# Patient Record
Sex: FEMALE | Race: OTHER | HISPANIC OR LATINO | URBAN - METROPOLITAN AREA
[De-identification: names, ages, dates, MRNs, and addresses within clinical notes are randomized per-mention and may not be internally consistent; named-entity substitution may affect disease eponyms.]

---

## 2018-04-06 ENCOUNTER — EMERGENCY (EMERGENCY)
Facility: HOSPITAL | Age: 73
LOS: 1 days | Discharge: HOME | End: 2018-04-06
Attending: EMERGENCY MEDICINE | Admitting: EMERGENCY MEDICINE
Payer: MEDICARE

## 2018-04-06 VITALS
HEART RATE: 78 BPM | TEMPERATURE: 98 F | RESPIRATION RATE: 16 BRPM | SYSTOLIC BLOOD PRESSURE: 172 MMHG | OXYGEN SATURATION: 95 % | DIASTOLIC BLOOD PRESSURE: 92 MMHG

## 2018-04-06 DIAGNOSIS — R68.84 JAW PAIN: ICD-10-CM

## 2018-04-06 DIAGNOSIS — M79.601 PAIN IN RIGHT ARM: ICD-10-CM

## 2018-04-06 LAB
ANION GAP SERPL CALC-SCNC: 6 MMOL/L — LOW (ref 9–16)
BASOPHILS NFR BLD AUTO: 0.6 % — SIGNIFICANT CHANGE UP (ref 0–2)
BUN SERPL-MCNC: 15 MG/DL — SIGNIFICANT CHANGE UP (ref 7–23)
CALCIUM SERPL-MCNC: 9.5 MG/DL — SIGNIFICANT CHANGE UP (ref 8.5–10.5)
CHLORIDE SERPL-SCNC: 105 MMOL/L — SIGNIFICANT CHANGE UP (ref 96–108)
CO2 SERPL-SCNC: 26 MMOL/L — SIGNIFICANT CHANGE UP (ref 22–31)
CREAT SERPL-MCNC: 0.63 MG/DL — SIGNIFICANT CHANGE UP (ref 0.5–1.3)
EOSINOPHIL NFR BLD AUTO: 1.7 % — SIGNIFICANT CHANGE UP (ref 0–6)
GLUCOSE SERPL-MCNC: 119 MG/DL — HIGH (ref 70–99)
HCT VFR BLD CALC: 38.6 % — SIGNIFICANT CHANGE UP (ref 34.5–45)
HGB BLD-MCNC: 12.8 G/DL — SIGNIFICANT CHANGE UP (ref 11.5–15.5)
IMM GRANULOCYTES NFR BLD AUTO: 0.2 % — SIGNIFICANT CHANGE UP (ref 0–1.5)
LYMPHOCYTES # BLD AUTO: 21.4 % — SIGNIFICANT CHANGE UP (ref 13–44)
MCHC RBC-ENTMCNC: 29.1 PG — SIGNIFICANT CHANGE UP (ref 27–34)
MCHC RBC-ENTMCNC: 33.2 G/DL — SIGNIFICANT CHANGE UP (ref 32–36)
MCV RBC AUTO: 87.7 FL — SIGNIFICANT CHANGE UP (ref 80–100)
MONOCYTES NFR BLD AUTO: 6.4 % — SIGNIFICANT CHANGE UP (ref 2–14)
NEUTROPHILS NFR BLD AUTO: 69.7 % — SIGNIFICANT CHANGE UP (ref 43–77)
PLATELET # BLD AUTO: 246 K/UL — SIGNIFICANT CHANGE UP (ref 150–400)
POTASSIUM SERPL-MCNC: 3.8 MMOL/L — SIGNIFICANT CHANGE UP (ref 3.5–5.3)
POTASSIUM SERPL-SCNC: 3.8 MMOL/L — SIGNIFICANT CHANGE UP (ref 3.5–5.3)
RBC # BLD: 4.4 M/UL — SIGNIFICANT CHANGE UP (ref 3.8–5.2)
RBC # FLD: 12.4 % — SIGNIFICANT CHANGE UP (ref 10.3–16.9)
SODIUM SERPL-SCNC: 137 MMOL/L — SIGNIFICANT CHANGE UP (ref 132–145)
TROPONIN I SERPL-MCNC: <0.017 NG/ML — LOW (ref 0.02–0.06)
WBC # BLD: 5.3 K/UL — SIGNIFICANT CHANGE UP (ref 3.8–10.5)
WBC # FLD AUTO: 5.3 K/UL — SIGNIFICANT CHANGE UP (ref 3.8–10.5)

## 2018-04-06 PROCEDURE — 99284 EMERGENCY DEPT VISIT MOD MDM: CPT

## 2018-04-06 RX ORDER — TRAMADOL HYDROCHLORIDE 50 MG/1
1 TABLET ORAL
Qty: 8 | Refills: 0 | OUTPATIENT
Start: 2018-04-06 | End: 2018-04-07

## 2018-04-06 RX ORDER — KETOROLAC TROMETHAMINE 30 MG/ML
30 SYRINGE (ML) INJECTION ONCE
Qty: 0 | Refills: 0 | Status: DISCONTINUED | OUTPATIENT
Start: 2018-04-06 | End: 2018-04-06

## 2018-04-06 RX ORDER — TRAMADOL HYDROCHLORIDE 50 MG/1
1 TABLET ORAL
Qty: 10 | Refills: 0 | OUTPATIENT
Start: 2018-04-06 | End: 2018-04-10

## 2018-04-06 RX ORDER — TRAMADOL HYDROCHLORIDE 50 MG/1
1 TABLET ORAL
Qty: 1 | Refills: 0 | OUTPATIENT
Start: 2018-04-06

## 2018-04-06 RX ADMIN — Medication 30 MILLIGRAM(S): at 12:07

## 2018-04-06 NOTE — ED PROVIDER NOTE - OBJECTIVE STATEMENT
cc R arm pain x2 weeks, worse w movment. no weaknes/snumbness. radiates into the chest. no fevres/n/v/abd oapin/dairrhea. no sob. no diaphoresis.   no cardiac hx. today had dental procedure done for root canal on L jaw c/o L jaw pain and sewlling. came today for R arm evaluation because her daughter was off work and insisted she have it evaled . pt has pmd at [place]

## 2018-04-06 NOTE — ED PROVIDER NOTE - MUSCULOSKELETAL, MLM
Spine appears normal, range of motion is not limited, no muscle or joint tenderness. Pain w ROM of R shoulder

## 2018-04-06 NOTE — ED ADULT TRIAGE NOTE - CHIEF COMPLAINT QUOTE
Patient reports R arm pain radiating to R chest x2 mos. Denies injury. Had facial biopsy done today at [facility]. Daughter w/patient.

## 2018-04-06 NOTE — ED PROVIDER NOTE - MEDICAL DECISION MAKING DETAILS
low risk acs, pain reproducible. will get torp x1, ekg non ischemic   toradol for pain   neuro inatct, no neck pain. low c/f cervical cord compression.

## 2018-04-06 NOTE — ED PROVIDER NOTE - NEUROLOGICAL, MLM
Alert and oriented, no focal deficits, no motor or sensory deficits. intact bilat 5/5 . 5/5 flex/ext at bilat shoulders/elbows/wrists. able to show two fingers, extend bilat thumbs and oppose index and thumb bilat. 2+ radial pulse bilat

## 2018-04-06 NOTE — ED ADULT NURSE NOTE - CHIEF COMPLAINT QUOTE
Patient reports R arm pain radiating to R chest x2 mos. Denies injury. Had facial biopsy done today at [facility]. Daughter w/patient.

## 2018-04-06 NOTE — ED ADULT NURSE NOTE - OBJECTIVE STATEMENT
worsening right arm pain x 2 months-denies trauma, left jaw swelling noted (pt had teeth removed today), daughter at bedside, will continue to monitor for change in assessment

## 2018-04-06 NOTE — ED PROVIDER NOTE - CARE PLAN
Assessment and plan of treatment:	R arm pain radiating to chest Principal Discharge DX:	Right arm pain  Assessment and plan of treatment:	R arm pain radiating to chest

## 2020-01-05 ENCOUNTER — EMERGENCY (EMERGENCY)
Facility: HOSPITAL | Age: 75
LOS: 1 days | Discharge: HOME | End: 2020-01-05
Attending: EMERGENCY MEDICINE | Admitting: EMERGENCY MEDICINE
Payer: MEDICARE

## 2020-01-05 VITALS
RESPIRATION RATE: 17 BRPM | HEART RATE: 69 BPM | DIASTOLIC BLOOD PRESSURE: 75 MMHG | TEMPERATURE: 97 F | SYSTOLIC BLOOD PRESSURE: 143 MMHG | OXYGEN SATURATION: 96 %

## 2020-01-05 VITALS
RESPIRATION RATE: 17 BRPM | HEART RATE: 88 BPM | WEIGHT: 134.92 LBS | DIASTOLIC BLOOD PRESSURE: 77 MMHG | SYSTOLIC BLOOD PRESSURE: 180 MMHG | OXYGEN SATURATION: 98 % | TEMPERATURE: 98 F
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
## 2020-01-05 DIAGNOSIS — S02.401A MAXILLARY FRACTURE, UNSPECIFIED SIDE, INITIAL ENCOUNTER FOR CLOSED FRACTURE: ICD-10-CM
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
## 2020-01-05 DIAGNOSIS — J34.89 OTHER SPECIFIED DISORDERS OF NOSE AND NASAL SINUSES: ICD-10-CM
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
## 2020-01-05 DIAGNOSIS — S02.2XXA FRACTURE OF NASAL BONES, INITIAL ENCOUNTER FOR CLOSED FRACTURE: ICD-10-CM
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
## 2020-01-05 PROBLEM — K29.60 OTHER GASTRITIS WITHOUT BLEEDING: Chronic | Status: ACTIVE | Noted: 2018-04-06

## 2020-01-05 PROCEDURE — 70486 CT MAXILLOFACIAL W/O DYE: CPT | Mod: 26

## 2020-01-05 PROCEDURE — 21310: CPT

## 2020-01-05 PROCEDURE — 99284 EMERGENCY DEPT VISIT MOD MDM: CPT | Mod: 25

## 2020-01-05 PROCEDURE — 72125 CT NECK SPINE W/O DYE: CPT | Mod: 26

## 2020-01-05 PROCEDURE — 70450 CT HEAD/BRAIN W/O DYE: CPT | Mod: 26

## 2020-01-05 NOTE — ED PROVIDER NOTE - OBJECTIVE STATEMENT
73 y/o F wit PMHx of asthma on Advair, Albuterol, and Qvar presents to the ED s/p fall 3 days ago. States she missed last step and fell forward injuring the bridge of her nose 3 days ago. Today she endorses nasal pain and dizziness described as lightheadedness with a slight headache. Denies LOC, UE pain, neck pain, back pain, visual changes, numbness, weakness, tingling, nausea or vomiting. 75 y/o F wit PMHx of asthma on Advair, Albuterol, and Qvar, taking aspirin presents to the ED s/p fall 3 days ago. States she missed a step and fell forward injuring the bridge of her nose 3 days ago. Today she endorses nasal pain and dizziness described as lightheadedness with a slight headache. Denies LOC, UE pain, neck pain, back pain, visual changes, numbness, weakness, tingling, nausea or vomiting.

## 2020-01-05 NOTE — ED ADULT TRIAGE NOTE - CHIEF COMPLAINT QUOTE
had mechanical fall 2dasys ago with facial trauma. ptwith ecchymosis and hematomas under both eyes. denies loc butnow c/o dizziness. takes baby asa qday

## 2020-01-05 NOTE — ED PROVIDER NOTE - NSFOLLOWUPINSTRUCTIONS_ED_ALL_ED_FT
Facial Fracture    WHAT YOU NEED TO KNOW:    A facial fracture is a break in one or more of the bones in your face. A facial fracture may also damage nearby tissue. Skull         DISCHARGE INSTRUCTIONS:    Call your local emergency number (911 in the [place]) if:     You suddenly feel lightheaded and short of breath.      You have chest pain when you take a deep breath or cough.      You cough up blood.    Return to the emergency department if:     You suddenly have trouble chewing or swallowing.      You have clear or pinkish fluid draining from your nose or mouth.      You have numbness in your face.      You have worsening pain in your eye or face.      You have double vision or sudden trouble seeing.      Your arm or leg feels warm, tender, and painful. It may look swollen and red.    Call your doctor if:     You are bleeding from a wound on your face.      You have questions or concerns about your condition or care.    Medicines: You may need any of the following:     Decongestants help decrease swelling in your nose and sinuses. This medicine may also help you breathe easier.      Prescription pain medicine may be given. Ask your healthcare provider how to take this medicine safely. Some prescription pain medicines contain acetaminophen. Do not take other medicines that contain acetaminophen without talking to your healthcare provider. Too much acetaminophen may cause liver damage. Prescription pain medicine may cause constipation. Ask your healthcare provider how to prevent or treat constipation.       Steroids help decrease swelling in your face.      Antibiotics help treat an infection caused by bacteria. This medicine may be given if you have an open wound.      Take your medicine as directed. Contact your healthcare provider if you think your medicine is not helping or if you have side effects. Tell him or her if you are allergic to any medicine. Keep a list of the medicines, vitamins, and herbs you take. Include the amounts, and when and why you take them. Bring the list or the pill bottles to follow-up visits. Carry your medicine list with you in case of an emergency.    Nutrition: You may not be able to eat solid food for a period of time. You may first be started on a liquid diet, starting with water, broth, gelatin, apple juice, or lemon-lime soda pop. After a few days, you may be allowed to eat soft foods, such as applesauce, bananas, cooked cereal, cottage cheese, pudding, and yogurt. Ask for more information about the type of foods you can eat.    Rehabilitation: If you had surgery to fix your facial fracture, you may need oral and facial rehabilitation. This is done to restore normal use and movement of your facial muscles. Ask for more information about rehabilitation.    Self-care:     Apply ice as directed. Ice helps decrease swelling and pain. Ice may also help prevent tissue damage. Use an ice pack, or put crushed ice in a plastic bag. Cover the bag with a towel before you place it on your skin. Apply ice for 15 to 20 minutes every hour or as directed.      Keep your head elevated. Keep your head above the level of your heart as often as you can. This will help decrease swelling and pain. Prop your upper body on pillows or blankets to keep your head elevated comfortably.      Do not put pressure on your face:   Do not sleep on the injured side of your face. Pressure may cause more damage.      Sneeze with your mouth open to decrease pressure on your broken facial bones. Too much pressure from a sneeze may cause your broken bones to move and cause more damage.      Try not to blow your nose. It may cause more damage if you have a fracture near your eye. The pressure from blowing your nose may pinch the nerve of your eye and cause permanent damage.      Clean your mouth carefully. It may be hard to clean your teeth if have an injury or fracture near your mouth. Your healthcare provider will show you the best way to do this so you do not hurt yourself. A waterpik or a child-sized soft toothbrush may work well to clean your mouth.    Follow up with your doctor as directed: Write down your questions so you remember to ask them during your visits.       © Copyright [employer] 2020

## 2020-01-05 NOTE — ED PROVIDER NOTE - PATIENT PORTAL LINK FT
You can access the FollowMyHealth Patient Portal offered by [facility] by registering at the following website: [email or web address]. By joining [employer]’s FollowMyHealth portal, you will also be able to view your health information using other applications (apps) compatible with our system.

## 2020-01-05 NOTE — ED PROVIDER NOTE - ENMT, MLM
Airway patent. Raccoon eyes with ecchymosis of infraorbital region. Normal septum with tenderness to proximal aspect of nasal bone.

## 2020-01-05 NOTE — ED PROVIDER NOTE - CHPI ED SYMPTOMS NEG
no tingling/no neck pain, no UE pain, no visual changes, no n/v/no loss of consciousness/no numbness/no weakness

## 2020-01-05 NOTE — ED PROVIDER NOTE - CLINICAL SUMMARY MEDICAL DECISION MAKING FREE TEXT BOX
75 y/o F with 3 days of ecchymosis to face s/p mechanical misstep and fall. Pt reports poorly described lightheadedness and some headaches since. Given age and aspirin treatment will obtain CT  head, spine, and maxillofacial.

## 2020-01-05 NOTE — ED PROVIDER NOTE - CARE PLAN
Principal Discharge DX:	Maxillary fracture, unspecified side, initial encounter for closed fracture  Secondary Diagnosis:	Closed fracture of nasal bone, initial encounter

## 2021-06-12 ENCOUNTER — EMERGENCY (EMERGENCY)
Facility: HOSPITAL | Age: 76
LOS: 1 days | Discharge: HOME | End: 2021-06-12
Attending: EMERGENCY MEDICINE | Admitting: EMERGENCY MEDICINE
Payer: MEDICARE

## 2021-06-12 VITALS
WEIGHT: 123.02 LBS | DIASTOLIC BLOOD PRESSURE: 68 MMHG | OXYGEN SATURATION: 94 % | TEMPERATURE: 99 F | SYSTOLIC BLOOD PRESSURE: 136 MMHG | HEART RATE: 100 BPM | RESPIRATION RATE: 18 BRPM

## 2021-06-12 VITALS
RESPIRATION RATE: 18 BRPM | HEART RATE: 102 BPM | SYSTOLIC BLOOD PRESSURE: 166 MMHG | OXYGEN SATURATION: 97 % | DIASTOLIC BLOOD PRESSURE: 72 MMHG

## 2021-06-12 DIAGNOSIS — J45.41 MODERATE PERSISTENT ASTHMA WITH (ACUTE) EXACERBATION: ICD-10-CM

## 2021-06-12 DIAGNOSIS — Z20.822 CONTACT WITH AND (SUSPECTED) EXPOSURE TO COVID-19: ICD-10-CM

## 2021-06-12 DIAGNOSIS — Z79.891 LONG TERM (CURRENT) USE OF OPIATE ANALGESIC: ICD-10-CM

## 2021-06-12 DIAGNOSIS — R05 COUGH: ICD-10-CM

## 2021-06-12 LAB — SARS-COV-2 RNA SPEC QL NAA+PROBE: SIGNIFICANT CHANGE UP

## 2021-06-12 PROCEDURE — 99284 EMERGENCY DEPT VISIT MOD MDM: CPT

## 2021-06-12 PROCEDURE — 71045 X-RAY EXAM CHEST 1 VIEW: CPT | Mod: 26

## 2021-06-12 RX ORDER — ALBUTEROL 90 UG/1
2.5 AEROSOL, METERED ORAL ONCE
Refills: 0 | Status: COMPLETED | OUTPATIENT
Start: 2021-06-12 | End: 2021-06-12

## 2021-06-12 RX ORDER — ALBUTEROL 90 UG/1
1 AEROSOL, METERED ORAL
Qty: 30 | Refills: 3
Start: 2021-06-12 | End: 2021-07-01

## 2021-06-12 RX ORDER — IPRATROPIUM/ALBUTEROL SULFATE 18-103MCG
3 AEROSOL WITH ADAPTER (GRAM) INHALATION
Refills: 0 | Status: COMPLETED | OUTPATIENT
Start: 2021-06-12 | End: 2021-06-12

## 2021-06-12 RX ORDER — CLARITHROMYCIN 500 MG
1 TABLET ORAL
Qty: 4 | Refills: 0
Start: 2021-06-12 | End: 2021-06-15

## 2021-06-12 RX ORDER — SODIUM CHLORIDE 9 MG/ML
1000 INJECTION INTRAMUSCULAR; INTRAVENOUS; SUBCUTANEOUS
Refills: 0 | Status: DISCONTINUED | OUTPATIENT
Start: 2021-06-12 | End: 2021-06-12

## 2021-06-12 RX ORDER — AZITHROMYCIN 500 MG/1
500 TABLET, FILM COATED ORAL ONCE
Refills: 0 | Status: COMPLETED | OUTPATIENT
Start: 2021-06-12 | End: 2021-06-12

## 2021-06-12 RX ADMIN — ALBUTEROL 2.5 MILLIGRAM(S): 90 AEROSOL, METERED ORAL at 18:54

## 2021-06-12 RX ADMIN — Medication 3 MILLILITER(S): at 16:54

## 2021-06-12 RX ADMIN — Medication 3 MILLILITER(S): at 17:45

## 2021-06-12 RX ADMIN — Medication 3 MILLILITER(S): at 17:15

## 2021-06-12 RX ADMIN — Medication 125 MILLIGRAM(S): at 17:44

## 2021-06-12 RX ADMIN — AZITHROMYCIN 500 MILLIGRAM(S): 500 TABLET, FILM COATED ORAL at 19:23

## 2021-06-12 RX ADMIN — SODIUM CHLORIDE 1000 MILLILITER(S): 9 INJECTION INTRAMUSCULAR; INTRAVENOUS; SUBCUTANEOUS at 19:24

## 2021-06-12 RX ADMIN — SODIUM CHLORIDE 125 MILLILITER(S): 9 INJECTION INTRAMUSCULAR; INTRAVENOUS; SUBCUTANEOUS at 17:45

## 2021-06-12 NOTE — ED ADULT TRIAGE NOTE - CHIEF COMPLAINT QUOTE
Pt c/o productive cough and congestion x 3 days. Hx of asthma. Traveled to [place] 3 weeks ago. Fully vaccinated against covid. Took tynelol at 1pm.

## 2021-06-12 NOTE — ED PROVIDER NOTE - PATIENT PORTAL LINK FT
You can access the FollowMyHealth Patient Portal offered by [facility] by registering at the following website: [email or web address]. By joining [employer]’s FollowMyHealth portal, you will also be able to view your health information using other applications (apps) compatible with our system.

## 2021-06-12 NOTE — ED PROVIDER NOTE - PHYSICAL EXAMINATION
General:  Well appearing, no distress  HEENT:  No conjunctival injection, no ocular discharge, external ears WNL, no pharyngeal injection or exudates  Chest:  Non-tender, no crepitance  Lungs:  Clear to auscultation bilaterally   Heart:  s1s2 normal, no murmur  Abdomen:  soft, non-tender, non-distended  :  Deferred  Rectal:  Deferred  Extremities: No edema, normal perfusion, no joint swelling or tenderness  Neuro:  Alert and oriented x 3, cn2-12 intact, full strength, sensory grossly intact, normal gait  Psychiatry:  Calm, cooperative, no expression of suicidal or homicidal ideation

## 2021-06-12 NOTE — ED PROVIDER NOTE - NS ED ROS FT
Constitutional:  No fever, no weakness, no dizziness, no wt loss  HEENT:  No change in vision, no discharge, no congestion  Cardiac:  Chest congestion, No chest pain, palpitations  Pulm:  Cough, mild SOB, wheezing  GI:  No abd pain, no vomiting, no diarrhea  : No hematuria  Neuro:  No ha, no neck pain, no numbness, no weakness, no change in speech, vision or gait  MSK:  No joint pain or swelling  Skin:  No rash  Heme:  No abnormal bruising  Psychiatric: No suicidal or homicidal ideation, no hallucinations

## 2021-06-12 NOTE — ED ADULT NURSE NOTE - CHIEF COMPLAINT QUOTE
Pt c/o productive cough and congestion x 3 days. Hx of asthma. Traveled to [place] 3 weeks ago. Fully vaccinated against covid. Took tynelol at 1pm.

## 2021-06-12 NOTE — ED PROVIDER NOTE - PROGRESS NOTE DETAILS
Patient with some improvement.  Less work of breathing, but still mildly dyspneic, tachypneic with moderate wheezing.  Air entry improved.  Has completed one neb.  Addl nebs pending. Discuss need for neb with RN.  Nebs were combined for one administration instead of 3 separate nebs as ordered.  Patient completed treatment nearly an hour ago and so will be given another neb now. Patient is feeling much better.  speaking clearly, states she feels ready to go home.  lungs mostly clear other than mild rhonchi, wheezing resolved.  will have her ambulate and check pulse ox/resp status.  If she remains well, will dc.

## 2021-06-12 NOTE — ED PROVIDER NOTE - OBJECTIVE STATEMENT
76 y/o F with Hx of asthma and gastritis, presents with a cough and fever since Wednesday. She thinks she caught it from her daughter who is sick. She had a temperature as high as 103 degrees F Thursday night. She reports she had some improvement with Albuterol, Flonase, and Mucinex, but continues with wheezing, chest congestion, and mild SOB. No CP. No known allergies. Non smoker. 76 y/o F with Hx of asthma and gastritis, presents with a cough and fever since Wednesday. She thinks she caught it from her daughter who is sick. She had a temperature as high as 103 degrees F Thursday night. She reports she had some improvement with Albuterol, Flonase, and Mucinex, but continues with wheezing, chest congestion, and mild SOB. No CP. No known allergies. Non smoker.  No prior ICU/intubations, no recent steroids.

## 2021-06-12 NOTE — ED PROVIDER NOTE - NSFOLLOWUPINSTRUCTIONS_ED_ALL_ED_FT
MAKE SURE YOU USE YOUR STEROID INHALER EVERY DAY    USE YOUR NEBULIZER EVERY 4-6 HOURS FOR THE NEXT 1-2 DAYS    RETURN TO THE EMERGENCY ROOM IMMEDIATELY FOR:  TROUBLE BREATHING  CHEST PAIN  YOU FEEL LIKE YOU NEED THE NEBULIZER EVERY 1-2 HOURS AND IT ISN'T HELPING    SEE YOUR REGULAR DOCTOR MONDAY      Asthma Attack       Asthma attack, also called acute bronchospasm, is the sudden narrowing and tightening of the air passages, which limits the amount of oxygen that can get into the lungs. The narrowing is caused by inflammation and tightening of the muscles in the air tubes (bronchi) in the lungs.    Too much mucus is also produced, which narrows the airways more. This can cause trouble breathing, loud breathing (wheezing), and coughing. The goal of treatment is to open the airways in the lungs and reduce inflammation.      What are the causes?  Possible causes or triggers of this condition include:  •Animal dander, dust mites, or cockroaches.      •Mold, pollen from trees or grass, or cold air.      •Air pollutants such as dust, household , aerosol sprays, strong chemicals, strong odors, and smoke of any kind.      •Stress or strong emotions such as crying or laughing hard.      •Exercise or activity that requires a lot of energy.      •Substances in foods and drinks, such as dried fruits and wine, called sulfites.    •Certain medicines or medical conditions such as:  •Aspirin or beta-blockers.      •Infections or inflammatory conditions, such as a flu (influenza), a cold, pneumonia, or inflammation of the nasal membranes (rhinitis).      •Gastroesophageal reflux disease (GERD). GERD is a condition in which stomach acid backs up into your esophagus and spills into your trachea (windpipe), which can irritate your airways.          What are the signs or symptoms?  Symptoms of this condition include:  •Wheezing. This may sound like whistling while breathing. This may only happen at night.      •Excessive coughing. This may only happen at night.      •Chest tightness or pain.      •Shortness of breath.      •Feeling like you cannot get enough air no matter how hard you breathe (air hunger).        How is this diagnosed?  This condition may be diagnosed based on:  •Your medical history.      •Your symptoms.      •A physical exam.    •Tests to check for other causes of your symptoms or other conditions that may have triggered your asthma attack. These tests may include:  •A chest X-ray.      •Blood tests.      •Tests to assess lung function, such as breathing into a device that measures how much air you can inhale and exhale (spirometry).          How is this treated?  Treatment for this condition depends on the severity and cause of your asthma attack.•For mild attacks, you may receive medicines through a hand-held inhaler (metered dose inhaler, or MDI) or through a device that turns liquid medicine into a mist (nebulizer). These medicines include:  •Quick relief or rescue medicines that quickly relax the airways and lungs.      •Long-acting medicines that are used daily to prevent (control) your asthma symptoms.        •For moderate or severe attacks, you may be treated with steroid medicines by mouth or through an IV injection at the hospital.      •For severe attacks, you may need oxygen therapy or a breathing machine (ventilator).      •If your asthma attack was caused by an infection from bacteria, you will be given antibiotic medicines.        Follow these instructions at home:    Medicines   •Take over-the-counter and prescription medicines only as told by your health care provider.   •Keep your medicines up-to-date.       •Make sure you have all of your medicines available at all times.        •If you were prescribed an antibiotic medicine, take it as told by your health care provider. Do not stop taking the antibiotic even if you start to feel better.      •Tell your doctor if you may be pregnant to make sure your asthma medicine is safe to use during pregnancy.        Avoiding triggers      •Keep track of things that trigger your asthma attacks. Avoid exposure to these triggers.      • Do not use any products that contain nicotine or tobacco, such as cigarettes, e-cigarettes, and chewing tobacco. If you need help quitting, ask your health care provider.      •When there is a lot of pollen, air pollution, or humidity, keep windows closed and use an air conditioner or go to places with air conditioning.      Asthma action plan   •Work with your health care provider to make a written plan for managing and treating your asthma attacks (asthma action plan). This plan should include:  •A list of your asthma triggers and how to avoid them.      •A list of symptoms that you may have during an asthma attack.      •Information about which medicine to take, when to take the medicine and how much of the medicine to take.      •Information to help you understand your peak flow measurements.      •Daily actions that you can take to control your asthma symptoms.      •Contact information for your health care providers.        •If you have an asthma attack, act quickly. Follow the emergency steps on your written asthma action plan. This may prevent you from needing to go to the hospital.      General instructions     •Avoid excessive exercise or activity until your asthma attack goes away. Ask your health care provider what activities are safe for you and when you can return to your normal activities.      •Stay up to date on all your vaccines, such as flu and pneumonia vaccines.      •Drink enough fluid to keep your urine pale yellow. Staying hydrated helps keep mucus in your lungs thin so it can be coughed up easily.      • Do not use alcohol until you have recovered.      •Keep all follow-up visits as told by your health care provider. This is important. Asthma requires careful medical care.        Contact a health care provider if:    •You have followed your action plan for 1 hour and your peak flow reading is still at 50–79%. This is in the yellow zone, which means "caution."      •You need to use your quick reliever medicine more frequently than normal.      •Your medicines are causing side effects, such as rash, itching, swelling, or trouble breathing.      •Your symptoms do not improve after 48 hours.      •You cough up mucus that is thicker than usual.      •You have a fever.        Get help right away if:    •Your peak flow reading is less than 50% of your personal best. This is in the red zone, which means "danger."      •You have trouble breathing.      •You develop chest pain or discomfort.      •Your medicines no longer seem to be helping.      •You are coughing up bloody mucus.      •You have a fever and your symptoms suddenly get worse.      •You have trouble swallowing.      •You feel very tired, and breathing becomes tiring.      These symptoms may represent a serious problem that is an emergency. Do not wait to see if the symptoms will go away. Get medical help right away. Call your local emergency services (911 in the [place] Do not drive yourself to the hospital.       Summary    •Asthma attacks are caused by narrowing or tightness in air passages, which causes shortness of breath, coughing, and loud breathing (wheezing).      •Many things can trigger an asthma attack, such as allergens, weather changes, exercise, strong odors, and smoke of any kind.      •If you have an asthma attack, act quickly. Follow the emergency steps on your written asthma action plan.      •Get help right away if you have severe trouble breathing, chest pain, or fever, or if your home medicines are no longer helping with your symptoms.      This information is not intended to replace advice given to you by your health care provider. Make sure you discuss any questions you have with your health care provider.

## 2021-06-12 NOTE — ED PROVIDER NOTE - CLINICAL SUMMARY MEDICAL DECISION MAKING FREE TEXT BOX
74 yo F hx asthma presents with febrile URI since Wednesday with exacerbation of asthma.  Will be given nebs, steroids, have cxr and covid testing.

## 2021-06-12 NOTE — ED ADULT NURSE REASSESSMENT NOTE - NS ED NURSE REASSESS COMMENT FT1
Pt received from [facility]. Pt resting comfortably in bed, No s/s of acute distress. Will continue to monitor

## 2021-06-13 PROBLEM — J45.909 UNSPECIFIED ASTHMA, UNCOMPLICATED: Chronic | Status: ACTIVE | Noted: 2020-01-05

## 2022-03-12 ENCOUNTER — EMERGENCY (EMERGENCY)
Facility: HOSPITAL | Age: 77
LOS: 1 days | Discharge: HOME | End: 2022-03-12
Attending: EMERGENCY MEDICINE | Admitting: EMERGENCY MEDICINE
Payer: COMMERCIAL

## 2022-03-12 VITALS
TEMPERATURE: 98 F | RESPIRATION RATE: 16 BRPM | OXYGEN SATURATION: 94 % | SYSTOLIC BLOOD PRESSURE: 146 MMHG | HEART RATE: 92 BPM | DIASTOLIC BLOOD PRESSURE: 78 MMHG

## 2022-03-12 VITALS
RESPIRATION RATE: 16 BRPM | OXYGEN SATURATION: 98 % | SYSTOLIC BLOOD PRESSURE: 144 MMHG | DIASTOLIC BLOOD PRESSURE: 83 MMHG | HEART RATE: 90 BPM

## 2022-03-12 DIAGNOSIS — J45.901 UNSPECIFIED ASTHMA WITH (ACUTE) EXACERBATION: ICD-10-CM

## 2022-03-12 DIAGNOSIS — Z20.822 CONTACT WITH AND (SUSPECTED) EXPOSURE TO COVID-19: ICD-10-CM

## 2022-03-12 DIAGNOSIS — R06.2 WHEEZING: ICD-10-CM

## 2022-03-12 LAB — SARS-COV-2 RNA SPEC QL NAA+PROBE: SIGNIFICANT CHANGE UP

## 2022-03-12 PROCEDURE — 71045 X-RAY EXAM CHEST 1 VIEW: CPT | Mod: 26

## 2022-03-12 PROCEDURE — 99285 EMERGENCY DEPT VISIT HI MDM: CPT | Mod: 25

## 2022-03-12 RX ORDER — ALBUTEROL 90 UG/1
2.5 AEROSOL, METERED ORAL ONCE
Refills: 0 | Status: COMPLETED | OUTPATIENT
Start: 2022-03-12 | End: 2022-03-12

## 2022-03-12 RX ORDER — ALBUTEROL 90 UG/1
1 AEROSOL, METERED ORAL
Qty: 1 | Refills: 0
Start: 2022-03-12

## 2022-03-12 RX ORDER — IPRATROPIUM/ALBUTEROL SULFATE 18-103MCG
3 AEROSOL WITH ADAPTER (GRAM) INHALATION ONCE
Refills: 0 | Status: COMPLETED | OUTPATIENT
Start: 2022-03-12 | End: 2022-03-12

## 2022-03-12 RX ADMIN — ALBUTEROL 2.5 MILLIGRAM(S): 90 AEROSOL, METERED ORAL at 14:36

## 2022-03-12 RX ADMIN — Medication 50 MILLIGRAM(S): at 12:33

## 2022-03-12 RX ADMIN — Medication 3 MILLILITER(S): at 12:34

## 2022-03-12 NOTE — ED PROVIDER NOTE - NSFOLLOWUPINSTRUCTIONS_ED_ALL_ED_FT
take medications as directed     follow up with your doctor next week    return to ER if symptoms worsen or for nay other concern

## 2022-03-12 NOTE — ED PROVIDER NOTE - PROGRESS NOTE DETAILS
the patient appears improved - decreased wheezing  - and she states she feels much better   second neb then dc

## 2022-03-12 NOTE — ED ADULT NURSE NOTE - OBJECTIVE STATEMENT
Pt aox3 with steady gait. Pt states worsening cough and sob x 3 days. Hx asthma. Pt states she had pna in January denies fevers today.

## 2022-03-12 NOTE — ED PROVIDER NOTE - CLINICAL SUMMARY MEDICAL DECISION MAKING FREE TEXT BOX
The patient's symptoms progressively improved throughout the ED stay.  The patient tolerated PO fluids.  ED evaluation and management discussed with the patient and family (if available) in detail.  Close PMD and/or specialist follow up encouraged.  Strict ED return instructions discussed in detail for any worsening or new symptoms. The patient was given the opportunity to ask any questions about their discharge diagnosis and discharge instructions. The patient verbalized understanding of these instructions and need to return to the ED for any worsening of illness or for any concern. The patient received a printed version of the discharge instructions. The patient understands the Emergency Department diagnosis is a preliminary diagnosis often based on limited information and that the patient must adhere to the follow-up plan as discussed.  At the time of discharge from the Emergency Department, the patient is alert with fluent appropriate speech and ambulatory without difficulty.  A medical screening examination was performed and no emergency medical condition was identified.

## 2022-03-12 NOTE — ED PROVIDER NOTE - OBJECTIVE STATEMENT
triage note: · Chief Complaint Quote	Asthma exacerbation, with cough and congestion    76 yo woman with a history of longstanding asthma - presents with complaint of wheezing and sob "the same" symptoms she typically get with her asthma.  cough with white / green sputum  non smoker   [facility] [employer] [facility] covid vax - one year ago - pt had covid in 12/2021     no chest pain no fever no abd pain no headache no swelling to legs   no prior history of mi copd or pe  no history of intubation   last admit fo asthma 7 years ago

## 2022-06-03 ENCOUNTER — EMERGENCY (EMERGENCY)
Facility: HOSPITAL | Age: 77
LOS: 1 days | Discharge: HOME | End: 2022-06-03
Attending: EMERGENCY MEDICINE | Admitting: EMERGENCY MEDICINE
Payer: MEDICARE

## 2022-06-03 VITALS
SYSTOLIC BLOOD PRESSURE: 126 MMHG | OXYGEN SATURATION: 98 % | HEART RATE: 89 BPM | RESPIRATION RATE: 16 BRPM | DIASTOLIC BLOOD PRESSURE: 75 MMHG | TEMPERATURE: 98 F

## 2022-06-03 VITALS
HEIGHT: 60 IN | HEART RATE: 100 BPM | TEMPERATURE: 98 F | DIASTOLIC BLOOD PRESSURE: 84 MMHG | OXYGEN SATURATION: 97 % | RESPIRATION RATE: 18 BRPM | WEIGHT: 128.09 LBS | SYSTOLIC BLOOD PRESSURE: 130 MMHG

## 2022-06-03 DIAGNOSIS — K08.89 OTHER SPECIFIED DISORDERS OF TEETH AND SUPPORTING STRUCTURES: ICD-10-CM

## 2022-06-03 DIAGNOSIS — R51.9 HEADACHE, UNSPECIFIED: ICD-10-CM

## 2022-06-03 DIAGNOSIS — J45.909 UNSPECIFIED ASTHMA, UNCOMPLICATED: ICD-10-CM

## 2022-06-03 DIAGNOSIS — R68.84 JAW PAIN: ICD-10-CM

## 2022-06-03 PROCEDURE — 99283 EMERGENCY DEPT VISIT LOW MDM: CPT

## 2022-06-03 RX ORDER — TRAMADOL HYDROCHLORIDE 50 MG/1
1 TABLET ORAL
Qty: 16 | Refills: 0
Start: 2022-06-03

## 2022-06-03 RX ORDER — AMOXICILLIN 250 MG/5ML
1 SUSPENSION, RECONSTITUTED, ORAL (ML) ORAL
Qty: 30 | Refills: 0
Start: 2022-06-03 | End: 2022-06-12

## 2022-06-03 NOTE — ED ADULT TRIAGE NOTE - NSWEIGHTCALCTOOLDRUG_GEN_A_CORE
[facility] nurse called in stating they faxed in a plan of care for Monet and did receive the info back but it needed additional info so they faxed it back to our office. The [facility] nurse stated they have been having fax issues so they are not sure if the revised version was faxed back to them. Since they are having fax issues the [facility] nurse asked if she can  the revised plan of care at our office?    used

## 2022-06-03 NOTE — ED PROVIDER NOTE - CLINICAL SUMMARY MEDICAL DECISION MAKING FREE TEXT BOX
Pt with dental pain.  Will prescribe antibiotic empirically & tramadol for pain.  Referral to dental clinic.

## 2022-06-03 NOTE — ED PROVIDER NOTE - OBJECTIVE STATEMENT
She states she has had pain in her right lower jaw, next to one of her remaining molars, for one week.  No fevers.  No facial or jaw swelling.  No neck pain.  Mild headache at times and intermittent pain in her right ear..  Was told she needed an extraction in the past but never followed up.  Doesn't have a regular dentist.  No other complaints.

## 2022-06-03 NOTE — ED PROVIDER NOTE - NSFOLLOWUPINSTRUCTIONS_ED_ALL_ED_FT
Dental pain may be caused by many things, including:    Tooth decay (cavities or caries). Cavities expose the nerve of your tooth to air and to hot or cold temperatures. This can cause pain or discomfort.  Abscess or infection. A dental abscess is a collection of pus from a bacterial infection in the inner part of the tooth (pulp). It usually occurs at the end of the root of a tooth.  Injury.  An unknown reason (idiopathic).    Your pain may be mild or severe. It may occur when you are:    Chewing.  Exposed to hot or cold temperatures.  Eating or drinking sugary foods or beverages, such as soda or candy.    Your pain may be constant, or it may come and go without cause.    Follow these instructions at home:     Watch your dental pain for any changes. The following actions may help to lessen any discomfort that you are feeling:        Medicines    Take over-the-counter and prescription medicines only as told by your health care provider.  If you were prescribed an antibiotic medicine, take it as told by your health care provider. Do not stop taking the antibiotic even if you start to feel better.        Eating and drinking    Avoid foods or drinks that cause you pain, such as:    Very hot or very cold foods or drinks.  Sweet or sugary foods or drinks.        Managing pain and swelling    Apply ice to the painful area of your face:    Put ice in a plastic bag.  Place a towel between your skin and the bag.  Leave the ice on for 20 minutes, 2–3 times a day.        Brushing your teeth    To keep your mouth and gums healthy, use fluoride toothpaste to brush your teeth twice a day. Floss once a day.  Use a toothpaste made for sensitive teeth if directed by your health care provider.  Brush your teeth with a soft-bristled toothbrush.        General instructions    Do not apply heat to the outside of your face.  Gargle with a salt-water mixture 3–4 times a day or as needed. To make a salt-water mixture, completely dissolve ½–1 tsp of salt in 1 cup of warm water.  Keep all follow-up visits as told by your health care provider. This is important.  Apply ice to the outside of your jaw if there is swelling. Do not put ice directly on the skin.    Contact a health care provider if:  Your pain is not controlled with medicines.  Your symptoms get worse.  You have new symptoms.    Get help right away if you:  Are unable to open your mouth.  Are having trouble breathing or swallowing.  Have a fever.  Notice that your face, neck, or jaw is swollen.    Summary  Dental pain may be caused by many things, including tooth decay and infection.  Your pain may be mild or severe.  Take over-the-counter and prescription medicines only as told by your health care provider.  Watch your dental pain for any changes. Let your health care provider know if symptoms get worse.

## 2022-06-03 NOTE — ED ADULT NURSE NOTE - NSICDXPASTMEDICALHX_GEN_ALL_CORE_FT
PAST MEDICAL HISTORY:  Asthma     Other specified gastritis, presence of bleeding unspecified, unspecified chronicity

## 2022-06-03 NOTE — ED PROVIDER NOTE - PATIENT PORTAL LINK FT
You can access the FollowMyHealth Patient Portal offered by [facility] by registering at the following website: [email or web address]. By joining [facility]’s FollowMyHealth portal, you will also be able to view your health information using other applications (apps) compatible with our system.

## 2022-06-03 NOTE — ED ADULT NURSE NOTE - HIV OFFER
[FreeTextEntry1] : 1. Chest Pain: will order plain treadmill stress test and echocardiogram.\par \par 2. Hypertension: variable control. Continue current medical therapy for now. Will see how patient's BP response during the stress test and make adjustments to BP medications as needed.\par \par 3. Hypercholesterolemia: has been on atorvastatin 10mg daily. Tolerating medication. \par \par Patient can follow up with me after testing.
Previously Declined (within the last year)

## 2022-06-03 NOTE — ED PROVIDER NOTE - NSFOLLOWUPCLINICS_GEN_ALL_ED_FT
[facility]  Dental  [address] [place] [address]  Phone: [phone]  Fax:     Oral & Maxillofacial Surgery  Department of Dental Medicine  [address] [place] [address]  Phone: [phone]  Fax: [phone]    [facility]  Dental  [address] [place] [address]  Phone: [phone]  Fax:

## 2022-06-03 NOTE — ED PROVIDER NOTE - ENMT, MLM
Airway patent, Nasal mucosa clear. Mouth with normal mucosa. Throat has no vesicles, no oropharyngeal exudates and uvula is midline.  TMs WNL.  Dental caries present and multiple teeth missing from lower jaw.  Mild-moderate tenderness to percussion of right lower 2nd molar.  Mild gum swelling.  No purulent drainage.  No facial swelling or cellulitis.  No trismus.  Tongue normal.  No Jimy's.  No submandibular lymphadenopathy.

## 2022-11-02 ENCOUNTER — EMERGENCY (EMERGENCY)
Facility: HOSPITAL | Age: 77
LOS: 1 days | Discharge: HOME | End: 2022-11-02
Admitting: EMERGENCY MEDICINE

## 2022-11-02 VITALS
OXYGEN SATURATION: 95 % | TEMPERATURE: 98 F | DIASTOLIC BLOOD PRESSURE: 74 MMHG | SYSTOLIC BLOOD PRESSURE: 133 MMHG | RESPIRATION RATE: 17 BRPM | HEART RATE: 93 BPM

## 2022-11-02 VITALS
OXYGEN SATURATION: 98 % | WEIGHT: 128.09 LBS | RESPIRATION RATE: 20 BRPM | HEART RATE: 98 BPM | SYSTOLIC BLOOD PRESSURE: 163 MMHG | TEMPERATURE: 98 F | DIASTOLIC BLOOD PRESSURE: 82 MMHG | HEIGHT: 59 IN

## 2022-11-02 DIAGNOSIS — Z20.822 CONTACT WITH AND (SUSPECTED) EXPOSURE TO COVID-19: ICD-10-CM

## 2022-11-02 DIAGNOSIS — J45.901 UNSPECIFIED ASTHMA WITH (ACUTE) EXACERBATION: ICD-10-CM

## 2022-11-02 DIAGNOSIS — Z87.19 PERSONAL HISTORY OF OTHER DISEASES OF THE DIGESTIVE SYSTEM: ICD-10-CM

## 2022-11-02 DIAGNOSIS — R05.9 COUGH, UNSPECIFIED: ICD-10-CM

## 2022-11-02 LAB
FLUAV AG NPH QL: SIGNIFICANT CHANGE UP
FLUBV AG NPH QL: SIGNIFICANT CHANGE UP
RSV RNA NPH QL NAA+NON-PROBE: SIGNIFICANT CHANGE UP
SARS-COV-2 RNA SPEC QL NAA+PROBE: SIGNIFICANT CHANGE UP

## 2022-11-02 PROCEDURE — 71046 X-RAY EXAM CHEST 2 VIEWS: CPT | Mod: 26

## 2022-11-02 PROCEDURE — 99284 EMERGENCY DEPT VISIT MOD MDM: CPT | Mod: 25

## 2022-11-02 PROCEDURE — 71045 X-RAY EXAM CHEST 1 VIEW: CPT | Mod: 26

## 2022-11-02 RX ORDER — AZITHROMYCIN 500 MG/1
1 TABLET, FILM COATED ORAL
Qty: 4 | Refills: 0
Start: 2022-11-02 | End: 2022-11-05

## 2022-11-02 RX ORDER — IPRATROPIUM BROMIDE 0.2 MG/ML
500 SOLUTION, NON-ORAL INHALATION ONCE
Refills: 0 | Status: COMPLETED | OUTPATIENT
Start: 2022-11-02 | End: 2022-11-02

## 2022-11-02 RX ORDER — ALBUTEROL 90 UG/1
2 AEROSOL, METERED ORAL
Qty: 1 | Refills: 0
Start: 2022-11-02

## 2022-11-02 RX ORDER — MAGNESIUM SULFATE 500 MG/ML
2 VIAL (ML) INJECTION ONCE
Refills: 0 | Status: COMPLETED | OUTPATIENT
Start: 2022-11-02 | End: 2022-11-02

## 2022-11-02 RX ORDER — ALBUTEROL 90 UG/1
2.5 AEROSOL, METERED ORAL
Refills: 0 | Status: DISCONTINUED | OUTPATIENT
Start: 2022-11-02 | End: 2022-11-06

## 2022-11-02 RX ORDER — AZITHROMYCIN 500 MG/1
500 TABLET, FILM COATED ORAL ONCE
Refills: 0 | Status: DISCONTINUED | OUTPATIENT
Start: 2022-11-02 | End: 2022-11-06

## 2022-11-02 RX ADMIN — ALBUTEROL 2.5 MILLIGRAM(S): 90 AEROSOL, METERED ORAL at 13:35

## 2022-11-02 RX ADMIN — Medication 50 MILLIGRAM(S): at 13:52

## 2022-11-02 RX ADMIN — Medication 150 GRAM(S): at 13:52

## 2022-11-02 RX ADMIN — Medication 500 MICROGRAM(S): at 14:03

## 2022-11-02 NOTE — ED PROVIDER NOTE - PLAN OF CARE
Covid/flu/rsv swab negative. Cxr appears unremarkable. Pt feels improved after treatment, offered admission for continued observation for asthma exacerbation but pt defers and would prefer to be discharged with rx for prednisone. Pt states she has had similar asthma exacerbations in the past and does well with receiving prednisone prescription. Does not want to be admitted or monitored more extensively or had lab work obtained at this time. Will discharge with rx for prednisone, albuterol inhaler per patient request and refer to pulmonology for improved asthma control.    Discussed all results obtained at time of discharge with patient. Pt agrees with plan for discharge and further evaluation with outpatient follow up. Strict return precautions given, patient verbalized understanding and all questions answered. Pt currently stable for discharge.

## 2022-11-02 NOTE — ED PROVIDER NOTE - PATIENT PORTAL LINK FT
You can access the FollowMyHealth Patient Portal offered by [facility] by registering at the following website: [email or web address]. By joining [employer]’s FollowMyHealth portal, you will also be able to view your health information using other applications (apps) compatible with our system.

## 2022-11-02 NOTE — ED PROVIDER NOTE - RESPIRATORY, MLM
Diffuse wheezing bilaterally. No respiratory distress or accessory muscle use but increased difficulty when speaking. Diffuse wheezing bilaterally. No respiratory distress or accessory muscle use but increased sob when speaking with mild tachypnea

## 2022-11-02 NOTE — ED PROVIDER NOTE - CLINICAL SUMMARY MEDICAL DECISION MAKING FREE TEXT BOX
76 yo F with PMHx of asthma presenting today for productive cough x 3 days with white and sometimes green phlegm and associated shortness of breath. Pt reports recent travel to [place] and cruise to the [facility] from which she returned 2 days ago. Pt has been using nebulizer treatments every 4 hours with no improvement. On exam, pt is found to have diffuse wheezing bilaterally but has no respiratory distress and is otherwise well-appearing. Will give breathing treatments, steroids, and magnesium. Will obtain X-ray chest and medical labs. Will reassess. 78 y/o F with PMHx of asthma (denies prior intubations), denies other PMHx or daily medications, presents c/o cough and sob x 3 days.  Pt reports onset of sob and intermittently productive cough after returning from cruise and notes exposure to dog which is an asthma trigger for her, using inhalers daily w/o relief. No fever/chills or known sick contact. On exam, pt well-appearing in NAD, VSS, diffuse wheezing bilaterally. Concern for asthma exacerbation but will also assess for PNA, ACS vs less likely PE- notes recent travel but more likely diagnosis is asthma exacerbation. Plan for ekg, covid/flu/rsv swab, cxr, duonebs, mag, prednisone. Pt prefers to defer labs at this time and start with breathing treatments, will reassess.

## 2022-11-02 NOTE — ED PROVIDER NOTE - SKIN, MLM
Skin normal color for race, warm, dry. Skin normal color for race, warm, dry. No rash or urticaria noted

## 2022-11-02 NOTE — ED PROVIDER NOTE - ENMT NEGATIVE STATEMENT, MLM
Ears: no ear pain and no hearing problems. Nose: no nasal congestion and no nasal drainage. Mouth/Throat: no dysphagia, no hoarseness. Neck: no lumps, no pain, no stiffness and no swollen glands.

## 2022-11-02 NOTE — ED PROVIDER NOTE - CARE PLAN
1 Principal Discharge DX:	Acute asthma exacerbation  Assessment and plan of treatment:	Covid/flu/rsv swab negative. Cxr appears unremarkable. Pt feels improved after treatment, offered admission for continued observation for asthma exacerbation but pt defers and would prefer to be discharged with rx for prednisone. Pt states she has had similar asthma exacerbations in the past and does well with receiving prednisone prescription. Does not want to be admitted or monitored more extensively or had lab work obtained at this time. Will discharge with rx for prednisone, albuterol inhaler per patient request and refer to pulmonology for improved asthma control.    Discussed all results obtained at time of discharge with patient. Pt agrees with plan for discharge and further evaluation with outpatient follow up. Strict return precautions given, patient verbalized understanding and all questions answered. Pt currently stable for discharge.

## 2022-11-02 NOTE — ED PROVIDER NOTE - OBJECTIVE STATEMENT
76 yo F with PMHx of asthma, denies other medical problems or chronic medications, presenting today for cough x 3 days with associated shortness of breath. Pt states cough is productive with white and sometimes green phlegm. Denies fever, chills, chest pain, and leg swelling. Also denies any recent sicks contacts. However, pt endorses recent travel to [place] and a cruise to the [facility], came back Monday (2 days ago). Pt has been using nebulizer treatments every 4 hours with no improvement. Non-smoker. Allergy exacerbation from animal fur and rugs at her daughter's place. No hx of intubations or recent steroid use. 78 yo F with PMHx of asthma, denies other medical problems or chronic medications, presenting today for cough x 3 days with associated shortness of breath. Pt states cough is productive with white and sometimes green phlegm. Denies fever, chills, chest pain, and leg swelling. Also denies any recent sicks contacts. However, pt endorses recent travel to [place] and a cruise to the [facility], came back Monday (2 days ago). Pt has been using nebulizer treatments every 4 hours with no improvement. Non-smoker. Pt gets asthma exacerbation from animal fur and rugs at her daughter's place. No hx of intubations or recent steroid use. 76 y/o F with PMHx of asthma (denies prior intubations), denies other PMHx or daily medications, presents c/o cough and sob x 3 days. Pt reports cough is productive with white and green sputum, sob at rest but significantly worse with exertion. Pt reports recent travel to [place] for a cruise to [facility], returned Monday, and notes exposure to rugs containing dog fur which is an asthma trigger for her. Pt has been using albuterol and steroid inhalers multiple times per day w/o relief. Pt denies any known sick contact. Denies hx of DVT/PE, recent trauma or surgery, hemoptysis, leg swelling, OCP use. Denies fever/chills, HA, neck or back pain, dizziness, syncope, cp, abd pain, n/v/d/c, urinary ssx, leg swelling.

## 2022-11-07 ENCOUNTER — APPOINTMENT (OUTPATIENT)
Dept: PULMONOLOGY | Facility: CLINIC | Age: 77
End: 2022-11-07

## 2022-11-07 VITALS
OXYGEN SATURATION: 95 % | WEIGHT: 127 LBS | HEIGHT: 59 IN | SYSTOLIC BLOOD PRESSURE: 133 MMHG | TEMPERATURE: 98.9 F | DIASTOLIC BLOOD PRESSURE: 74 MMHG | BODY MASS INDEX: 25.6 KG/M2 | HEART RATE: 95 BPM

## 2022-11-07 DIAGNOSIS — Z23 ENCOUNTER FOR IMMUNIZATION: ICD-10-CM

## 2022-11-07 PROBLEM — Z00.00 ENCOUNTER FOR PREVENTIVE HEALTH EXAMINATION: Status: ACTIVE | Noted: 2022-11-07

## 2022-11-07 PROCEDURE — 99204 OFFICE O/P NEW MOD 45 MIN: CPT

## 2022-11-07 RX ORDER — ESOMEPRAZOLE MAGNESIUM 20 MG/1
20 CAPSULE, DELAYED RELEASE ORAL
Refills: 0 | Status: ACTIVE | COMMUNITY

## 2022-11-11 NOTE — PHYSICAL EXAM
[No Acute Distress] : no acute distress [Well Developed] : well developed [Normal Oropharynx] : normal oropharynx [III] : Mallampati Class: III [Normal Appearance] : normal appearance [No JVD] : no jvd [Normal Rate/Rhythm] : normal rate/rhythm [Normal S1, S2] : normal s1, s2 [No Resp Distress] : no resp distress [Clear to Auscultation Bilaterally] : clear to auscultation bilaterally [No Abnormalities] : no abnormalities [Kyphosis] : kyphosis [Benign] : benign [Not Tender] : not tender [Normal Gait] : normal gait [Gait - Sufficient For Exercise Testing] : gait sufficient for exercise testing [No Clubbing] : no clubbing [No Edema] : no edema [Normal Color/ Pigmentation] : normal color/ pigmentation [No Rash] : no rash

## 2022-11-11 NOTE — REVIEW OF SYSTEMS
[Fever] : no fever [Chills] : no chills [Dry Eyes] : no dry eyes [Eye Irritation] : no eye irritation [Cough] : cough [Sputum] : no sputum [Dyspnea] : no dyspnea [A.M. Dry Mouth] : no a.m. dry mouth [SOB on Exertion] : no sob on exertion [Chest Discomfort] : no chest discomfort [Orthopnea] : no orthopnea [Hay Fever] : no hay fever [Nasal Discharge] : no nasal discharge [GERD] : no gerd [Diarrhea] : no diarrhea [Nocturia] : no nocturia [Irregular Menses] : no irregular menses [Arthralgias] : no arthralgias [Trauma/ Injury] : no trauma/ injury [Raynaud] : no raynaud [Telangiectasias] : no telangiectasias [Anemia] : no anemia [History of Iron Deficiency] : no history of iron deficiency [Headache] : no headache [Dizziness] : no dizziness

## 2022-11-11 NOTE — DISCUSSION/SUMMARY
[FreeTextEntry1] : 77 year old female with PMHx of asthma (denies prior intubations) went to ED on 11/2/22 for asthma exacerbation.\par \par Review:\par ED records (11/22, 6/22, 3/22)\par Labs\par CXR (11/22): NAD\par \par A/P\par Asthma with multiple exacerbations:\par - 3 exacerbation so far in 2022 (Nov, June, March 2022)\par - Reason for exacerbation is lack of compliance with inhalers. Patient does not use maintenance inhaler.\par Plan:\par - Counselling was done about importance of daily use of maintenance inhaler\par - Plan to continue Wixela 1 puff bid with gargle\par - Prn albuterol\par - PFT with DLCO\par - Hold off on using Spiriva\par - D/c prednisone and azithromycin after completion of course.

## 2022-11-11 NOTE — HISTORY OF PRESENT ILLNESS
[Never] : never [TextBox_4] : 77 year old female with PMHx of asthma (denies prior intubations) went to ED on 11/2/22 for asthma exacerbation. She traveled to [place] for a cruise to [facility], returned Monday, and notes exposure to rugs containing dog fur which is an asthma trigger for her. Patient was given prednisone and azithromycin.She came today for follow up for management of asthma. \par  [ESS] : 2

## 2022-12-21 ENCOUNTER — APPOINTMENT (OUTPATIENT)
Dept: PULMONOLOGY | Facility: CLINIC | Age: 77
End: 2022-12-21

## 2022-12-21 PROCEDURE — 94060 EVALUATION OF WHEEZING: CPT

## 2022-12-21 PROCEDURE — 94618 PULMONARY STRESS TESTING: CPT

## 2022-12-21 PROCEDURE — 94726 PLETHYSMOGRAPHY LUNG VOLUMES: CPT

## 2022-12-21 PROCEDURE — 94729 DIFFUSING CAPACITY: CPT

## 2022-12-27 ENCOUNTER — APPOINTMENT (OUTPATIENT)
Dept: PULMONOLOGY | Facility: CLINIC | Age: 77
End: 2022-12-27

## 2023-01-03 ENCOUNTER — APPOINTMENT (OUTPATIENT)
Dept: PULMONOLOGY | Facility: CLINIC | Age: 78
End: 2023-01-03
Payer: MEDICARE

## 2023-01-03 VITALS
HEIGHT: 59 IN | WEIGHT: 123 LBS | OXYGEN SATURATION: 95 % | HEART RATE: 74 BPM | TEMPERATURE: 98.2 F | DIASTOLIC BLOOD PRESSURE: 78 MMHG | SYSTOLIC BLOOD PRESSURE: 149 MMHG | BODY MASS INDEX: 24.8 KG/M2

## 2023-01-03 DIAGNOSIS — J45.909 UNSPECIFIED ASTHMA, UNCOMPLICATED: ICD-10-CM

## 2023-01-03 PROCEDURE — 99214 OFFICE O/P EST MOD 30 MIN: CPT

## 2023-01-03 RX ORDER — ALBUTEROL SULFATE 90 UG/1
108 (90 BASE) INHALANT RESPIRATORY (INHALATION)
Qty: 1 | Refills: 5 | Status: ACTIVE | COMMUNITY
Start: 1900-01-01 | End: 1900-01-01

## 2023-01-03 RX ORDER — FLUTICASONE PROPIONATE AND SALMETEROL 500; 50 UG/1; UG/1
500-50 POWDER RESPIRATORY (INHALATION)
Qty: 1 | Refills: 5 | Status: ACTIVE | COMMUNITY
Start: 2023-01-03 | End: 1900-01-01

## 2023-01-03 RX ORDER — FLUTICASONE PROPIONATE AND SALMETEROL 250; 50 UG/1; UG/1
250-50 POWDER RESPIRATORY (INHALATION)
Refills: 0 | Status: DISCONTINUED | COMMUNITY
End: 2023-01-03

## 2023-01-03 RX ORDER — MONTELUKAST 10 MG/1
10 TABLET, FILM COATED ORAL
Qty: 30 | Refills: 5 | Status: ACTIVE | COMMUNITY
Start: 2023-01-03 | End: 1900-01-01

## 2023-01-03 RX ORDER — TIOTROPIUM BROMIDE INHALATION SPRAY 1.56 UG/1
SPRAY, METERED RESPIRATORY (INHALATION)
Refills: 0 | Status: DISCONTINUED | COMMUNITY
End: 2023-01-03

## 2023-01-03 NOTE — DISCUSSION/SUMMARY
[FreeTextEntry1] : 77 year old female with PMHx of asthma (denies prior intubations) went to ED on 11/2/22 for asthma exacerbation. History of 3 exacerbations in 2022. \par \par Review:\par PFT (12 /22 ): FEV1 92, FEV1/FVC 75, , DLCO 80, Rev 17%\par ED records (11/22, 6/22, 3/22)\par Labs\par CXR (11/22): NAD\par \par A/P\par Asthma with multiple exacerbations:\par - 3 exacerbation so far in 2022 (Nov, June, March 2022)\par - Reason for exacerbation is lack of compliance with inhalers. Patient did not use maintenance inhaler.\par  - No exacerbation after starting Wixela regularly\par - Significant reversibility on PFT done in Dec 2022. \par Plan:\par - Continue Wixela 1 puff bid with gargle\par - Add Montelukast (allergic reactions history, significant reversibility). Patient is aware of side effects. She used to take montelukast without side effect in the past. \par - Prn albuterol\par - Continue to hold off on using Spiriva\par - Follow up in 3 months

## 2023-01-03 NOTE — REVIEW OF SYSTEMS
[Fever] : no fever [Chills] : no chills [Dry Eyes] : no dry eyes [Eye Irritation] : no eye irritation [Cough] : no cough [Sputum] : no sputum [Dyspnea] : no dyspnea [A.M. Dry Mouth] : no a.m. dry mouth [SOB on Exertion] : no sob on exertion [Chest Discomfort] : no chest discomfort [Orthopnea] : no orthopnea [Hay Fever] : no hay fever [Nasal Discharge] : no nasal discharge [GERD] : no gerd [Diarrhea] : no diarrhea [Nocturia] : no nocturia [Irregular Menses] : no irregular menses [Arthralgias] : no arthralgias [Trauma/ Injury] : no trauma/ injury [Raynaud] : no raynaud [Telangiectasias] : no telangiectasias [Anemia] : no anemia [History of Iron Deficiency] : no history of iron deficiency [Headache] : no headache [Dizziness] : no dizziness

## 2023-01-03 NOTE — HISTORY OF PRESENT ILLNESS
[Never] : never [TextBox_4] : 77 year old female with PMHx of asthma (denies prior intubations) went to ED on 11/2/22 for asthma exacerbation. She traveled to [place] for a cruise to [facility], returned Monday, and notes exposure to rugs containing dog fur which is an asthma trigger for her. Patient was given prednisone and azithromycin.She came today for follow up for management of asthma. \par \par 1/3/22:\par PFT was done in Dec 2022. Patient did not have any exacerbation since Nov 2022 after starting Wixela. She has not used albuterol inhaler since Nov 2022. \par  [ESS] : 2

## 2023-05-04 NOTE — ED ADULT NURSE NOTE - SUICIDE SCREENING DEPRESSION
on the discharge service for the patient. I have reviewed and made amendments to the documentation where necessary.
Negative

## 2023-06-21 NOTE — ED ADULT TRIAGE NOTE - NSWEIGHTCALCTOOLDRUG_GEN_A_CORE
[facility] [place]  Nurses Note -- 4 Eyes      6/20/2023       Skin assessed on: Admit      [x] No Pressure Injuries Present    [x]Prevention Measures Documented    [] Yes LDA  for Pressure Injury Previously documented     [] Yes New Pressure Injury Discovered   [] LDA for New Pressure Injury Added      Attending RN:  Suzi Dixon RN     Second RN:  Nirmala JENKINS RN         used

## 2024-04-03 ENCOUNTER — EMERGENCY (EMERGENCY)
Facility: HOSPITAL | Age: 79
LOS: 1 days | Discharge: HOME | End: 2024-04-03
Attending: EMERGENCY MEDICINE | Admitting: EMERGENCY MEDICINE
Payer: MEDICARE

## 2024-04-03 VITALS
OXYGEN SATURATION: 94 % | SYSTOLIC BLOOD PRESSURE: 126 MMHG | HEART RATE: 88 BPM | DIASTOLIC BLOOD PRESSURE: 89 MMHG | TEMPERATURE: 98 F | RESPIRATION RATE: 23 BRPM | WEIGHT: 123.02 LBS

## 2024-04-03 VITALS
SYSTOLIC BLOOD PRESSURE: 127 MMHG | DIASTOLIC BLOOD PRESSURE: 66 MMHG | HEART RATE: 98 BPM | TEMPERATURE: 98 F | OXYGEN SATURATION: 97 % | RESPIRATION RATE: 17 BRPM

## 2024-04-03 DIAGNOSIS — J45.901 UNSPECIFIED ASTHMA WITH (ACUTE) EXACERBATION: ICD-10-CM

## 2024-04-03 DIAGNOSIS — R06.02 SHORTNESS OF BREATH: ICD-10-CM

## 2024-04-03 DIAGNOSIS — Z20.822 CONTACT WITH AND (SUSPECTED) EXPOSURE TO COVID-19: ICD-10-CM

## 2024-04-03 LAB
ALBUMIN SERPL ELPH-MCNC: 3.3 G/DL — LOW (ref 3.4–5)
ALP SERPL-CCNC: 57 U/L — SIGNIFICANT CHANGE UP (ref 40–120)
ALT FLD-CCNC: 36 U/L — SIGNIFICANT CHANGE UP (ref 12–42)
ANION GAP SERPL CALC-SCNC: 9 MMOL/L — SIGNIFICANT CHANGE UP (ref 9–16)
AST SERPL-CCNC: 24 U/L — SIGNIFICANT CHANGE UP (ref 15–37)
BASOPHILS # BLD AUTO: 0.03 K/UL — SIGNIFICANT CHANGE UP (ref 0–0.2)
BASOPHILS NFR BLD AUTO: 0.5 % — SIGNIFICANT CHANGE UP (ref 0–2)
BILIRUB SERPL-MCNC: 0.5 MG/DL — SIGNIFICANT CHANGE UP (ref 0.2–1.2)
BUN SERPL-MCNC: 12 MG/DL — SIGNIFICANT CHANGE UP (ref 7–23)
CALCIUM SERPL-MCNC: 9.4 MG/DL — SIGNIFICANT CHANGE UP (ref 8.5–10.5)
CHLORIDE SERPL-SCNC: 107 MMOL/L — SIGNIFICANT CHANGE UP (ref 96–108)
CO2 SERPL-SCNC: 27 MMOL/L — SIGNIFICANT CHANGE UP (ref 22–31)
CREAT SERPL-MCNC: 0.71 MG/DL — SIGNIFICANT CHANGE UP (ref 0.5–1.3)
EGFR: 88 ML/MIN/1.73M2 — SIGNIFICANT CHANGE UP
EOSINOPHIL # BLD AUTO: 0.66 K/UL — HIGH (ref 0–0.5)
EOSINOPHIL NFR BLD AUTO: 10.4 % — HIGH (ref 0–6)
FLUAV AG NPH QL: SIGNIFICANT CHANGE UP
FLUBV AG NPH QL: SIGNIFICANT CHANGE UP
GLUCOSE SERPL-MCNC: 118 MG/DL — HIGH (ref 70–99)
HCT VFR BLD CALC: 36.7 % — SIGNIFICANT CHANGE UP (ref 34.5–45)
HGB BLD-MCNC: 12 G/DL — SIGNIFICANT CHANGE UP (ref 11.5–15.5)
IMM GRANULOCYTES NFR BLD AUTO: 0.2 % — SIGNIFICANT CHANGE UP (ref 0–0.9)
LYMPHOCYTES # BLD AUTO: 1.86 K/UL — SIGNIFICANT CHANGE UP (ref 1–3.3)
LYMPHOCYTES # BLD AUTO: 29.4 % — SIGNIFICANT CHANGE UP (ref 13–44)
MCHC RBC-ENTMCNC: 29.6 PG — SIGNIFICANT CHANGE UP (ref 27–34)
MCHC RBC-ENTMCNC: 32.7 GM/DL — SIGNIFICANT CHANGE UP (ref 32–36)
MCV RBC AUTO: 90.4 FL — SIGNIFICANT CHANGE UP (ref 80–100)
MONOCYTES # BLD AUTO: 0.41 K/UL — SIGNIFICANT CHANGE UP (ref 0–0.9)
MONOCYTES NFR BLD AUTO: 6.5 % — SIGNIFICANT CHANGE UP (ref 2–14)
NEUTROPHILS # BLD AUTO: 3.36 K/UL — SIGNIFICANT CHANGE UP (ref 1.8–7.4)
NEUTROPHILS NFR BLD AUTO: 53 % — SIGNIFICANT CHANGE UP (ref 43–77)
NRBC # BLD: 0 /100 WBCS — SIGNIFICANT CHANGE UP (ref 0–0)
NT-PROBNP SERPL-SCNC: 69 PG/ML — SIGNIFICANT CHANGE UP
PLATELET # BLD AUTO: 234 K/UL — SIGNIFICANT CHANGE UP (ref 150–400)
POTASSIUM SERPL-MCNC: 3.7 MMOL/L — SIGNIFICANT CHANGE UP (ref 3.5–5.3)
POTASSIUM SERPL-SCNC: 3.7 MMOL/L — SIGNIFICANT CHANGE UP (ref 3.5–5.3)
PROT SERPL-MCNC: 6.7 G/DL — SIGNIFICANT CHANGE UP (ref 6.4–8.2)
RAPID RVP RESULT: SIGNIFICANT CHANGE UP
RBC # BLD: 4.06 M/UL — SIGNIFICANT CHANGE UP (ref 3.8–5.2)
RBC # FLD: 13 % — SIGNIFICANT CHANGE UP (ref 10.3–14.5)
RSV RNA NPH QL NAA+NON-PROBE: SIGNIFICANT CHANGE UP
SARS-COV-2 RNA SPEC QL NAA+PROBE: SIGNIFICANT CHANGE UP
SARS-COV-2 RNA SPEC QL NAA+PROBE: SIGNIFICANT CHANGE UP
SODIUM SERPL-SCNC: 143 MMOL/L — SIGNIFICANT CHANGE UP (ref 132–145)
TROPONIN I, HIGH SENSITIVITY RESULT: 8.8 NG/L — SIGNIFICANT CHANGE UP
WBC # BLD: 6.33 K/UL — SIGNIFICANT CHANGE UP (ref 3.8–10.5)
WBC # FLD AUTO: 6.33 K/UL — SIGNIFICANT CHANGE UP (ref 3.8–10.5)

## 2024-04-03 PROCEDURE — 71046 X-RAY EXAM CHEST 2 VIEWS: CPT | Mod: 26

## 2024-04-03 PROCEDURE — 99223 1ST HOSP IP/OBS HIGH 75: CPT

## 2024-04-03 RX ORDER — IPRATROPIUM/ALBUTEROL SULFATE 18-103MCG
3 AEROSOL WITH ADAPTER (GRAM) INHALATION
Refills: 0 | Status: COMPLETED | OUTPATIENT
Start: 2024-04-03 | End: 2024-04-03

## 2024-04-03 RX ORDER — ALBUTEROL 90 UG/1
2.5 AEROSOL, METERED ORAL ONCE
Refills: 0 | Status: COMPLETED | OUTPATIENT
Start: 2024-04-03 | End: 2024-04-03

## 2024-04-03 RX ORDER — MAGNESIUM SULFATE 500 MG/ML
2 VIAL (ML) INJECTION ONCE
Refills: 0 | Status: COMPLETED | OUTPATIENT
Start: 2024-04-03 | End: 2024-04-03

## 2024-04-03 RX ADMIN — Medication 3 MILLILITER(S): at 10:57

## 2024-04-03 RX ADMIN — Medication 3 MILLILITER(S): at 11:05

## 2024-04-03 RX ADMIN — Medication 3 MILLILITER(S): at 11:36

## 2024-04-03 RX ADMIN — ALBUTEROL 2.5 MILLIGRAM(S): 90 AEROSOL, METERED ORAL at 13:18

## 2024-04-03 RX ADMIN — Medication 150 GRAM(S): at 13:18

## 2024-04-03 RX ADMIN — Medication 125 MILLIGRAM(S): at 11:08

## 2024-04-03 NOTE — ED PROVIDER NOTE - PHYSICAL EXAMINATION
Constitutional: awake and alert, in no acute distress  HEENT: head normocephalic and atraumatic. moist mucous membranes  Eyes: extraocular movements intact, normal conjunctiva  Neck: supple, normal ROM  Cardiovascular: regular rate   Pulmonary: no respiratory distress, Speaking in full sentences without becoming short of breath, diffuse expiratory wheezing  Gastrointestinal: abdomen flat and nondistended  Skin: warm, dry, normal for ethnicity  Musculoskeletal: no edema, no deformity  Neurological: oriented x4, no focal neurologic deficit.   Psychiatric: calm and cooperative

## 2024-04-03 NOTE — ED ADULT NURSE REASSESSMENT NOTE - NS ED NURSE REASSESS COMMENT FT1
Received handoff from Areli RN. Patient in no acute distress. All needs met at this time. Safety intact- care continues.
pt a&ox4 with no more wheezing ausculated in B/L lungs. speaking in full sentences with no coarse coughing. Dr. Calvillo notified. will continue to monitor.

## 2024-04-03 NOTE — ED PROVIDER NOTE - OBJECTIVE STATEMENT
78-year-old female with history of asthma presents with 10 days of shortness of breath and wheezing.  States she has been using her albuterol at home without relief.  Patient also reports dry cough and chest pain when coughing.  No hemoptysis.  No leg swelling.

## 2024-04-03 NOTE — ED CDU PROVIDER DISPOSITION NOTE - CLINICAL COURSE
Pt feels much better after further albuterol and magnesium.  Wheezing markedly improved, only very scant wheezing on eval.  Pt wants to go home.  Stable for dc with short course steroids.  Pt does not need further albuterol at home.

## 2024-04-03 NOTE — ED ADULT TRIAGE NOTE - MODE OF ARRIVAL
Subjective:     Maximilian Lopes Walk in

## 2024-04-03 NOTE — ED ADULT NURSE NOTE - OBJECTIVE STATEMENT
pt a&ox3 c/o SOB with inspiratory and expiratory wheezing. PMH asthma. denies chest pain. PMH acid reflux. speaking in full sentences. nebulizers in progress. will continue to monitor.

## 2024-04-03 NOTE — ED PROVIDER NOTE - CLINICAL SUMMARY MEDICAL DECISION MAKING FREE TEXT BOX
78-year-old female with history of asthma, not on home O2, presents with 10 days of dry cough, wheezing, shortness of breath, persistent despite using albuterol at home.  On exam, patient is afebrile, vital signs are stable.  Patient is satting well on room air.  Patient has diffuse expiratory wheezing.  Speaking in full sentences without becoming short of breath.  No peripheral edema.  Suspect asthma exacerbation, possibly in setting of URI.  Plan for labs, chest x-ray, nebs, steroids, monitor, reassess.

## 2024-04-03 NOTE — ED CDU PROVIDER DISPOSITION NOTE - PATIENT PORTAL LINK FT
You can access the FollowMyHealth Patient Portal offered by [facility] by registering at the following website: [email or web address]. By joining [employer]’s FollowMyHealth portal, you will also be able to view your health information using other applications (apps) compatible with our system.

## 2024-09-07 NOTE — ED PROVIDER NOTE - SKIN, MLM
Group Topic:  Coping Skills Education    Date: 9/7/2024  Start Time: 1300  End Time: 1345  Facilitators: Dorita Oreilly MSW    Focus:  Grounding Techniques  Number in attendance: 12  Patients discussed the definition of grounding and the various techniques that they practice or have heard of. Then they watched a short video about 7 different techniques to try. Patients discussed what stood out to them, and then they went over the \"Introduction to Grounding\" handout. Group ended with encouragement to start using these techniques now and to add them to their coping skills toolbox.     Method: Group  Attendance: Present  Participation: Minimal  Patient Response: Attentive and Took notes  Mood: Anxious  Affect: Type: Anxious   Range: Full (normal)   Congruency: Congruent   Stability: Stable  Behavior/Socialization: Appropriate to group and Cooperative  Thought Process: Tracking  Task Performance: Follows directions  Patient Evaluation: Leaves class before finished  ASHLEY Nunes, LAURELW          Skin normal color for race, warm, dry and intact.

## 2025-04-04 ENCOUNTER — EMERGENCY (EMERGENCY)
Facility: HOSPITAL | Age: 80
LOS: 1 days | Discharge: HOME | End: 2025-04-04
Attending: EMERGENCY MEDICINE | Admitting: EMERGENCY MEDICINE
Payer: MEDICARE

## 2025-04-04 VITALS
HEART RATE: 99 BPM | SYSTOLIC BLOOD PRESSURE: 130 MMHG | RESPIRATION RATE: 18 BRPM | OXYGEN SATURATION: 98 % | DIASTOLIC BLOOD PRESSURE: 69 MMHG

## 2025-04-04 VITALS
WEIGHT: 123.9 LBS | TEMPERATURE: 98 F | SYSTOLIC BLOOD PRESSURE: 137 MMHG | HEART RATE: 109 BPM | DIASTOLIC BLOOD PRESSURE: 76 MMHG | HEIGHT: 59 IN | OXYGEN SATURATION: 92 %

## 2025-04-04 LAB
ALBUMIN SERPL ELPH-MCNC: 3.4 G/DL — SIGNIFICANT CHANGE UP (ref 3.4–5)
ALP SERPL-CCNC: 74 U/L — SIGNIFICANT CHANGE UP (ref 40–120)
ALT FLD-CCNC: 37 U/L — SIGNIFICANT CHANGE UP (ref 12–42)
ANION GAP SERPL CALC-SCNC: 6 MMOL/L — LOW (ref 9–16)
AST SERPL-CCNC: 64 U/L — HIGH (ref 15–37)
BASOPHILS # BLD AUTO: 0.04 K/UL — SIGNIFICANT CHANGE UP (ref 0–0.2)
BASOPHILS NFR BLD AUTO: 0.7 % — SIGNIFICANT CHANGE UP (ref 0–2)
BILIRUB SERPL-MCNC: 0.7 MG/DL — SIGNIFICANT CHANGE UP (ref 0.2–1.2)
BUN SERPL-MCNC: 19 MG/DL — SIGNIFICANT CHANGE UP (ref 7–23)
CALCIUM SERPL-MCNC: 10.2 MG/DL — SIGNIFICANT CHANGE UP (ref 8.5–10.5)
CHLORIDE SERPL-SCNC: 105 MMOL/L — SIGNIFICANT CHANGE UP (ref 96–108)
CO2 SERPL-SCNC: 26 MMOL/L — SIGNIFICANT CHANGE UP (ref 22–31)
CREAT SERPL-MCNC: 0.71 MG/DL — SIGNIFICANT CHANGE UP (ref 0.5–1.3)
EGFR: 88 ML/MIN/1.73M2 — SIGNIFICANT CHANGE UP
EGFR: 88 ML/MIN/1.73M2 — SIGNIFICANT CHANGE UP
EOSINOPHIL # BLD AUTO: 0.41 K/UL — SIGNIFICANT CHANGE UP (ref 0–0.5)
EOSINOPHIL NFR BLD AUTO: 7 % — HIGH (ref 0–6)
FLUAV AG NPH QL: SIGNIFICANT CHANGE UP
FLUBV AG NPH QL: SIGNIFICANT CHANGE UP
GLUCOSE SERPL-MCNC: 127 MG/DL — HIGH (ref 70–99)
HCT VFR BLD CALC: 39.5 % — SIGNIFICANT CHANGE UP (ref 34.5–45)
HGB BLD-MCNC: 13 G/DL — SIGNIFICANT CHANGE UP (ref 11.5–15.5)
IMM GRANULOCYTES # BLD AUTO: 0.01 K/UL — SIGNIFICANT CHANGE UP (ref 0–0.07)
IMM GRANULOCYTES NFR BLD AUTO: 0.2 % — SIGNIFICANT CHANGE UP (ref 0–0.9)
LACTATE BLDV-MCNC: 1.6 MMOL/L — SIGNIFICANT CHANGE UP (ref 0.5–2)
LYMPHOCYTES # BLD AUTO: 1.32 K/UL — SIGNIFICANT CHANGE UP (ref 1–3.3)
LYMPHOCYTES NFR BLD AUTO: 22.6 % — SIGNIFICANT CHANGE UP (ref 13–44)
MCHC RBC-ENTMCNC: 29.1 PG — SIGNIFICANT CHANGE UP (ref 27–34)
MCHC RBC-ENTMCNC: 32.9 G/DL — SIGNIFICANT CHANGE UP (ref 32–36)
MCV RBC AUTO: 88.6 FL — SIGNIFICANT CHANGE UP (ref 80–100)
MONOCYTES # BLD AUTO: 0.35 K/UL — SIGNIFICANT CHANGE UP (ref 0–0.9)
MONOCYTES NFR BLD AUTO: 6 % — SIGNIFICANT CHANGE UP (ref 2–14)
NEUTROPHILS # BLD AUTO: 3.7 K/UL — SIGNIFICANT CHANGE UP (ref 1.8–7.4)
NEUTROPHILS NFR BLD AUTO: 63.5 % — SIGNIFICANT CHANGE UP (ref 43–77)
NRBC # BLD AUTO: 0 K/UL — SIGNIFICANT CHANGE UP (ref 0–0)
NRBC # FLD: 0 K/UL — SIGNIFICANT CHANGE UP (ref 0–0)
NRBC BLD AUTO-RTO: 0 /100 WBCS — SIGNIFICANT CHANGE UP (ref 0–0)
NT-PROBNP SERPL-SCNC: 96 PG/ML — SIGNIFICANT CHANGE UP
PCO2 BLDV: 46 MMHG — HIGH (ref 39–42)
PH BLDV: 7.39 — SIGNIFICANT CHANGE UP (ref 7.32–7.43)
PLATELET # BLD AUTO: 243 K/UL — SIGNIFICANT CHANGE UP (ref 150–400)
PMV BLD: 10 FL — SIGNIFICANT CHANGE UP (ref 7–13)
PO2 BLDV: 51 MMHG — HIGH (ref 25–45)
POTASSIUM SERPL-MCNC: 4.3 MMOL/L — SIGNIFICANT CHANGE UP (ref 3.5–5.3)
POTASSIUM SERPL-MCNC: 6 MMOL/L — HIGH (ref 3.5–5.3)
POTASSIUM SERPL-SCNC: 4.3 MMOL/L — SIGNIFICANT CHANGE UP (ref 3.5–5.3)
POTASSIUM SERPL-SCNC: 6 MMOL/L — HIGH (ref 3.5–5.3)
PROT SERPL-MCNC: 7.6 G/DL — SIGNIFICANT CHANGE UP (ref 6.4–8.2)
RBC # BLD: 4.46 M/UL — SIGNIFICANT CHANGE UP (ref 3.8–5.2)
RBC # FLD: 12.7 % — SIGNIFICANT CHANGE UP (ref 10.3–14.5)
RSV RNA NPH QL NAA+NON-PROBE: SIGNIFICANT CHANGE UP
SAO2 % BLDV: 82.9 % — SIGNIFICANT CHANGE UP (ref 67–88)
SARS-COV-2 RNA SPEC QL NAA+PROBE: SIGNIFICANT CHANGE UP
SODIUM SERPL-SCNC: 137 MMOL/L — SIGNIFICANT CHANGE UP (ref 132–145)
SOURCE RESPIRATORY: SIGNIFICANT CHANGE UP
TROPONIN I, HIGH SENSITIVITY RESULT: 11.7 NG/L — SIGNIFICANT CHANGE UP
WBC # BLD: 5.83 K/UL — SIGNIFICANT CHANGE UP (ref 3.8–10.5)
WBC # FLD AUTO: 5.83 K/UL — SIGNIFICANT CHANGE UP (ref 3.8–10.5)

## 2025-04-04 PROCEDURE — 71045 X-RAY EXAM CHEST 1 VIEW: CPT | Mod: 26

## 2025-04-04 PROCEDURE — 99285 EMERGENCY DEPT VISIT HI MDM: CPT

## 2025-04-04 RX ORDER — PREDNISONE 20 MG/1
2 TABLET ORAL
Qty: 8 | Refills: 0
Start: 2025-04-04 | End: 2025-04-07

## 2025-04-04 RX ORDER — ALBUTEROL SULFATE 2.5 MG/3ML
1 VIAL, NEBULIZER (ML) INHALATION ONCE
Refills: 0 | Status: DISCONTINUED | OUTPATIENT
Start: 2025-04-04 | End: 2025-04-08

## 2025-04-04 RX ORDER — IPRATROPIUM BROMIDE AND ALBUTEROL SULFATE .5; 2.5 MG/3ML; MG/3ML
3 SOLUTION RESPIRATORY (INHALATION) ONCE
Refills: 0 | Status: COMPLETED | OUTPATIENT
Start: 2025-04-04 | End: 2025-04-04

## 2025-04-04 RX ORDER — TERBUTALINE SULFATE 2.5 MG/1
0.25 TABLET ORAL ONCE
Refills: 0 | Status: COMPLETED | OUTPATIENT
Start: 2025-04-04 | End: 2025-04-04

## 2025-04-04 RX ORDER — ALBUTEROL SULFATE 2.5 MG/3ML
2.5 VIAL, NEBULIZER (ML) INHALATION ONCE
Refills: 0 | Status: COMPLETED | OUTPATIENT
Start: 2025-04-04 | End: 2025-04-04

## 2025-04-04 RX ORDER — MAGNESIUM SULFATE 500 MG/ML
2 SYRINGE (ML) INJECTION ONCE
Refills: 0 | Status: COMPLETED | OUTPATIENT
Start: 2025-04-04 | End: 2025-04-04

## 2025-04-04 RX ORDER — METHYLPREDNISOLONE ACETATE 80 MG/ML
125 INJECTION, SUSPENSION INTRA-ARTICULAR; INTRALESIONAL; INTRAMUSCULAR; SOFT TISSUE ONCE
Refills: 0 | Status: COMPLETED | OUTPATIENT
Start: 2025-04-04 | End: 2025-04-04

## 2025-04-04 RX ADMIN — IPRATROPIUM BROMIDE AND ALBUTEROL SULFATE 3 MILLILITER(S): .5; 2.5 SOLUTION RESPIRATORY (INHALATION) at 16:57

## 2025-04-04 RX ADMIN — TERBUTALINE SULFATE 0.25 MILLIGRAM(S): 2.5 TABLET ORAL at 16:52

## 2025-04-04 RX ADMIN — IPRATROPIUM BROMIDE AND ALBUTEROL SULFATE 3 MILLILITER(S): .5; 2.5 SOLUTION RESPIRATORY (INHALATION) at 16:56

## 2025-04-04 RX ADMIN — Medication 150 GRAM(S): at 16:55

## 2025-04-04 RX ADMIN — METHYLPREDNISOLONE ACETATE 125 MILLIGRAM(S): 80 INJECTION, SUSPENSION INTRA-ARTICULAR; INTRALESIONAL; INTRAMUSCULAR; SOFT TISSUE at 16:53

## 2025-04-04 RX ADMIN — Medication 2.5 MILLIGRAM(S): at 19:08

## 2025-04-04 NOTE — ED PROVIDER NOTE - CLINICAL SUMMARY MEDICAL DECISION MAKING FREE TEXT BOX
Asthma exacerbation.  No respiratory distress.  O2 sat 92 to 93% on room air.  Will order nebs, steroids, mag, terbutaline, chest x-ray rule out pneumonia, labs reassess.

## 2025-04-04 NOTE — ED PROVIDER NOTE - ATTENDING APP SHARED VISIT CONTRIBUTION OF CARE
I performed the initial face to face bedside interview with this patient regarding history of present illness, review of symptoms and past medical, social and family history.  I completed an independent physical examination.  I was the initial provider who evaluated this patient.  The history, review of symptoms and examination was documented by the PA in my presence and I attest to the accuracy of the documentation.  I have discussed the patient’s plan of care and disposition with the PA.

## 2025-04-04 NOTE — ED ADULT NURSE NOTE - OBJECTIVE STATEMENT
pt presents to the ed c/o cough and shortness of breath. pt states she has a pmhx of asthma, and has been doing treatments at home q4 hrs w no relief

## 2025-04-04 NOTE — ED PROVIDER NOTE - OBJECTIVE STATEMENT
79-year-old female with history of asthma, HTN presents complaining of wheezing for the past week.  Patient states this all started when she went to visit family and was exposed to a dog which caused her allergies to flareup and cause wheezing.  She is complaining of a mild cough that is productive.  Denies fever, chills.  She states she used albuterol nebs at home with minimal relief.  She has a pulmonologist she follows with.  Denies history of intubation for asthma.  Non-smoker.  she reports this feels similar to previous asthma exacerbations.

## 2025-04-04 NOTE — ED PROVIDER NOTE - NSFOLLOWUPINSTRUCTIONS_ED_ALL_ED_FT
Take steroids as prescribed  Use albuterol pump 2 pumps as needed for shortness of breath.  Follow up with your pulmonologist.     Asthma is a condition in which the airways tighten and narrow, making it difficult to breath. Asthma episodes, also called asthma attacks, range from minor to life-threatening. Symptoms include wheezing, coughing, chest tightness, or shortness of breath. The diagnosis of asthma is made by a review of your medical history and a physical exam, but may involve additional testing. Asthma cannot be cured, but medicines and lifestyle changes can help control it. Avoid triggers of asthma which may include animal dander, pollen, mold, smoke, air pollutants, etc.     SEEK IMMEDIATE MEDICAL CARE IF YOU HAVE ANY OF THE FOLLOWING SYMPTOMS: worsening of symptoms, shortness of breath at rest, chest pain, bluish discoloration to lips or fingertips, lightheadedness/dizziness, or fever.

## 2025-04-04 NOTE — ED PROVIDER NOTE - PATIENT PORTAL LINK FT
You can access the FollowMyHealth Patient Portal offered by [facility] by registering at the following website: [email or web address]. By joining [employer]’s FollowMyHealth portal, you will also be able to view your health information using other applications (apps) compatible with our system.

## 2025-04-04 NOTE — ED PROVIDER NOTE - PROGRESS NOTE DETAILS
patient reports she is feeling "so much better" and asking to leave. 02 sat on RA 96%, ambulated with patient while on pulse ox, lowest was 95% on RA asymptomatic, she is asking to leave. will give albuterol pump to go home with. rx prednisone x 4 days, she will follow up with her pulmonologist, return precautions discussed.

## 2025-04-04 NOTE — ED PROVIDER NOTE - PHYSICAL EXAMINATION
ABDOMINAL PAIN/NAUSEA CONSTITUTIONAL: Well-appearing; well-nourished; in no distress.   	HEAD: Normocephalic; atraumatic.   	EYES:  conjunctiva and sclera clear  	ENT: normal nose; no rhinorrhea; normal pharynx with no erythema or lesions.   	NECK: Supple; non-tender;   	CARDIOVASCULAR: Normal S1, S2; no murmurs, rubs, or gallops. Regular rate and rhythm.   	RESPIRATORY: Breathing easily; speaking full sentences, expiratory wheezing. no accessory muscle use.   	GI: Soft; non-distended; non-tender  	EXT: CEBALLOS x 4.   	SKIN: Normal for age and race; warm; dry; good turgor; no apparent lesions or rash.   	NEURO: A & O x 3; face symmetric; grossly unremarkable.   PSYCHOLOGICAL: The patient’s mood and manner are appropriate.

## 2025-04-05 ENCOUNTER — EMERGENCY (EMERGENCY)
Facility: HOSPITAL | Age: 80
LOS: 1 days | End: 2025-04-05
Attending: STUDENT IN AN ORGANIZED HEALTH CARE EDUCATION/TRAINING PROGRAM | Admitting: STUDENT IN AN ORGANIZED HEALTH CARE EDUCATION/TRAINING PROGRAM

## 2025-04-05 VITALS
TEMPERATURE: 98 F | HEIGHT: 59 IN | SYSTOLIC BLOOD PRESSURE: 112 MMHG | WEIGHT: 123.9 LBS | OXYGEN SATURATION: 98 % | HEART RATE: 99 BPM | RESPIRATION RATE: 17 BRPM | DIASTOLIC BLOOD PRESSURE: 77 MMHG

## 2025-04-05 VITALS
SYSTOLIC BLOOD PRESSURE: 163 MMHG | DIASTOLIC BLOOD PRESSURE: 88 MMHG | HEART RATE: 81 BPM | RESPIRATION RATE: 18 BRPM | OXYGEN SATURATION: 97 % | TEMPERATURE: 98 F

## 2025-04-05 PROCEDURE — 99284 EMERGENCY DEPT VISIT MOD MDM: CPT

## 2025-04-05 RX ORDER — DIPHENHYDRAMINE HCL 12.5MG/5ML
50 ELIXIR ORAL ONCE
Refills: 0 | Status: COMPLETED | OUTPATIENT
Start: 2025-04-05 | End: 2025-04-05

## 2025-04-05 RX ADMIN — Medication 50 MILLIGRAM(S): at 19:34

## 2025-04-05 NOTE — ED PROVIDER NOTE - PATIENT PORTAL LINK FT
You can access the FollowMyHealth Patient Portal offered by [facility] by registering at the following website: [email or web address]. By joining [employer]’s FollowMyHealth portal, you will also be able to view your health information using other applications (apps) compatible with our system.

## 2025-04-05 NOTE — ED ADULT NURSE NOTE - OBJECTIVE STATEMENT
patient presents with facial redness, burning sensation and itching since being given medications yesterday while in the ED.  Also has itchiness and rash to right arm. Patient stated " I think its the prednisone" pt denies any difficulty breathing, swelling of the tongue or throat. chest pain, fever, chills.

## 2025-04-05 NOTE — ED PROVIDER NOTE - NS_EDPROVIDERDISPOUSERTYPE_ED_A_ED
Tried to contact pt no answer and VM full. If pt calls back looks like she wants to reschedule the appt she has 4/24/25 at 10:45 am with Dr. Reyes.-TM 2/4/25   Attending Attestation (For Attendings USE Only)...

## 2025-04-05 NOTE — ED ADULT NURSE NOTE - NSFALLLASTSIX_ED_ALL_ED
Detail Level: Detailed Depth Of Biopsy: dermis Was A Bandage Applied: Yes Size Of Lesion In Cm: 0 Biopsy Type: H and E Biopsy Method: double edge Personna blade Anesthesia Type: 1% lidocaine with epinephrine Anesthesia Volume In Cc: 0.5 Hemostasis: Jose's Wound Care: Petrolatum Dressing: bandage Destruction After The Procedure: No Type Of Destruction Used: Curettage Curettage Text: The wound bed was treated with curettage after the biopsy was performed. Cryotherapy Text: The wound bed was treated with cryotherapy after the biopsy was performed. Electrodesiccation Text: The wound bed was treated with electrodesiccation after the biopsy was performed. Electrodesiccation And Curettage Text: The wound bed was treated with electrodesiccation and curettage after the biopsy was performed. Silver Nitrate Text: The wound bed was treated with silver nitrate after the biopsy was performed. Lab: 6 Lab Facility: 3 Consent: Written consent was obtained and risks were reviewed including but not limited to scarring, infection, bleeding, scabbing, incomplete removal, nerve damage and allergy to anesthesia. Post-Care Instructions: I reviewed with the patient in detail post-care instructions. Patient is to keep the biopsy site dry overnight, and then apply bacitracin twice daily until healed. Patient may apply hydrogen peroxide soaks to remove any crusting. Notification Instructions: Patient will be notified of biopsy results. However, patient instructed to call the office if not contacted within 2 weeks. Billing Type: Third-Party Bill Information: Selecting Yes will display possible errors in your note based on the variables you have selected. This validation is only offered as a suggestion for you. PLEASE NOTE THAT THE VALIDATION TEXT WILL BE REMOVED WHEN YOU FINALIZE YOUR NOTE. IF YOU WANT TO FAX A PRELIMINARY NOTE YOU WILL NEED TO TOGGLE THIS TO 'NO' IF YOU DO NOT WANT IT IN YOUR FAXED NOTE. No. no concerns

## 2025-04-05 NOTE — ED PROVIDER NOTE - CLINICAL SUMMARY MEDICAL DECISION MAKING FREE TEXT BOX
Patient with a history of asthma on home nebs seasonal allergies presents with itching redness burning over the face and over the right upper arm at the site of IV that was placed in this emergency room yesterday.  Denies any chest tightness throat swelling tongue swelling or swelling.  States that she believes that she may be reacting to a medication that she was given yesterday.  Reviewed the chart patient was treated for asthma exacerbation secondary to allergic exposure had been given nebs Solu-Medrol terbutaline and magnesium.  Patient is currently taking oral prednisone.  On exam patient is well-appearing neurovasc intact there is soft basilar wheezes which she states are much improved since her visit yesterday.  Moving air well.  Airway is patent there is no kissing tonsils.  There is a erythematous plaque over both cheeks and walled marcated area of edema and erythema at the antecubital fossa in the distribution of the Tegaderm had been placed.  Patient advised to continue prednisone.  Advised Benadryl for rash.  Advise follow-up with allergist.

## 2025-04-05 NOTE — ED PROVIDER NOTE - PROVIDER TOKENS
FREE:[LAST:[PMD],PHONE:[(   )    -],FAX:[(   )    -],ADDRESS:[one week]],PROVIDER:[TOKEN:[[id number]]]

## 2025-04-05 NOTE — ED ADULT NURSE NOTE - COVID-19  TEST TYPE
Plan: Recommends patient to continue to treat legs with Tazorac and Efudex every Winter or Fall. Detail Level: Simple MOLECULAR PCR

## 2025-04-05 NOTE — ED ADULT TRIAGE NOTE - CHIEF COMPLAINT QUOTE
pt c/o facial redness and itching since being given medications yesterday while in the ED. pt stated " I think its the prednisone" pt denies any difficulty breathing, denies swelling of the tongue or throat

## 2025-04-05 NOTE — ED PROVIDER NOTE - CONSTITUTIONAL, MLM
Ask patient which 1 of these (naproxen or meloxicam)  she is taking.   She should only be taking 1 or the other, not both normal... Well appearing, awake, alert, oriented to person, place, time/situation and in no apparent distress.

## 2025-04-05 NOTE — ED PROVIDER NOTE - CARE PROVIDER_API CALL
PMD,   one week  Phone: (   )    -  Fax: (   )    -  Follow Up Time:     Maninder Sam  Allergy and Immunology  [address] [place] [address], [place] [address]  Phone: [phone]  Fax: [phone]  Follow Up Time:

## 2025-04-05 NOTE — ED PROVIDER NOTE - ATTENDING APP SHARED VISIT CONTRIBUTION OF CARE
Patient with a history of asthma on home nebs seasonal allergies presents with itching redness burning over the face and over the right upper arm at the site of IV that was placed in this emergency room yesterday.  Denies any chest tightness throat swelling tongue swelling or swelling.  States that she believes that she may be reacting to a medication that she was given yesterday.  Reviewed the chart patient was treated for asthma exacerbation secondary to allergic exposure had been given nebs Solu-Medrol terbutaline and magnesium.  Patient is currently taking oral prednisone.  .    On exam patient is well-appearing neurovasc intact there is soft basilar wheezes which she states are much improved since her visit yesterday.  Moving air well.  Airway is patent there is no kissing tonsils.  There is a erythematous plaque over both cheeks and walled macerated area of edema and erythema at the antecubital fossa in the distribution of the Tegaderm had been placed.  Patient advised to continue prednisone.  Advised Benadryl for rash.  Advise follow-up with allergist.

## 2025-04-07 DIAGNOSIS — I10 ESSENTIAL (PRIMARY) HYPERTENSION: ICD-10-CM

## 2025-04-07 DIAGNOSIS — R06.2 WHEEZING: ICD-10-CM

## 2025-04-07 DIAGNOSIS — J45.901 UNSPECIFIED ASTHMA WITH (ACUTE) EXACERBATION: ICD-10-CM
